# Patient Record
Sex: MALE | Race: WHITE | URBAN - METROPOLITAN AREA
[De-identification: names, ages, dates, MRNs, and addresses within clinical notes are randomized per-mention and may not be internally consistent; named-entity substitution may affect disease eponyms.]

---

## 2017-01-01 ENCOUNTER — OFFICE VISIT (OUTPATIENT)
Dept: PULMONOLOGY | Facility: CLINIC | Age: 67
End: 2017-01-01
Attending: INTERNAL MEDICINE
Payer: COMMERCIAL

## 2017-01-01 ENCOUNTER — TELEPHONE (OUTPATIENT)
Dept: PULMONOLOGY | Facility: CLINIC | Age: 67
End: 2017-01-01

## 2017-01-01 VITALS
OXYGEN SATURATION: 99 % | HEIGHT: 74 IN | WEIGHT: 194.8 LBS | DIASTOLIC BLOOD PRESSURE: 86 MMHG | SYSTOLIC BLOOD PRESSURE: 123 MMHG | BODY MASS INDEX: 25 KG/M2 | TEMPERATURE: 98.5 F | RESPIRATION RATE: 16 BRPM | HEART RATE: 84 BPM

## 2017-01-01 DIAGNOSIS — J84.112 IPF (IDIOPATHIC PULMONARY FIBROSIS) (H): ICD-10-CM

## 2017-01-01 DIAGNOSIS — J84.112 IPF (IDIOPATHIC PULMONARY FIBROSIS) (H): Primary | ICD-10-CM

## 2017-01-01 LAB
ALBUMIN SERPL-MCNC: 3.7 G/DL (ref 3.4–5)
ALP SERPL-CCNC: 99 U/L (ref 40–150)
ALT SERPL W P-5'-P-CCNC: 21 U/L (ref 0–70)
AST SERPL W P-5'-P-CCNC: 21 U/L (ref 0–45)
BILIRUB DIRECT SERPL-MCNC: 0.1 MG/DL (ref 0–0.2)
BILIRUB SERPL-MCNC: 0.3 MG/DL (ref 0.2–1.3)
DLCOUNC-%PRED-PRE: 20 %
DLCOUNC-PRE: 5.81 ML/MIN/MMHG
DLCOUNC-PRED: 28.45 ML/MIN/MMHG
ERV-%PRED-PRE: 75 %
ERV-PRE: 1.16 L
ERV-PRED: 1.54 L
EXPTIME-PRE: 5.77 SEC
FEF2575-%PRED-PRE: 150 %
FEF2575-PRE: 4.26 L/SEC
FEF2575-PRED: 2.84 L/SEC
FEFMAX-%PRED-PRE: 81 %
FEFMAX-PRE: 7.75 L/SEC
FEFMAX-PRED: 9.55 L/SEC
FEV1-%PRED-PRE: 59 %
FEV1-PRE: 2.24 L
FEV1FEV6-PRE: 92 %
FEV1FEV6-PRED: 78 %
FEV1FVC-PRE: 92 %
FEV1FVC-PRED: 76 %
FEV1SVC-PRE: 96 %
FEV1SVC-PRED: 68 %
FIFMAX-PRE: 3.99 L/SEC
FVC-%PRED-PRE: 48 %
FVC-PRE: 2.43 L
FVC-PRED: 4.98 L
IC-%PRED-PRE: 29 %
IC-PRE: 1.17 L
IC-PRED: 3.99 L
PROT SERPL-MCNC: 7.6 G/DL (ref 6.8–8.8)
VA-%PRED-PRE: 50 %
VA-PRE: 3.85 L
VC-%PRED-PRE: 42 %
VC-PRE: 2.34 L
VC-PRED: 5.53 L

## 2017-01-01 PROCEDURE — 80076 HEPATIC FUNCTION PANEL: CPT | Performed by: INTERNAL MEDICINE

## 2017-01-01 PROCEDURE — 99212 OFFICE O/P EST SF 10 MIN: CPT | Mod: ZF

## 2017-01-01 PROCEDURE — 36415 COLL VENOUS BLD VENIPUNCTURE: CPT | Performed by: INTERNAL MEDICINE

## 2017-01-01 ASSESSMENT — ENCOUNTER SYMPTOMS
HEMOPTYSIS: 0
LOSS OF CONSCIOUSNESS: 0
TROUBLE SWALLOWING: 1
DIARRHEA: 0
EYE PAIN: 0
WHEEZING: 0
TREMORS: 0
DEPRESSION: 0
DYSPNEA ON EXERTION: 1
INSOMNIA: 0
SINUS PAIN: 1
SEIZURES: 0
LIGHT-HEADEDNESS: 1
PANIC: 1
NAIL CHANGES: 0
EYE IRRITATION: 0
DOUBLE VISION: 0
CLAUDICATION: 0
SMELL DISTURBANCE: 0
DIZZINESS: 1
DIFFICULTY URINATING: 1
SHORTNESS OF BREATH: 1
DYSURIA: 1
ABDOMINAL PAIN: 1
TACHYCARDIA: 1
EYE REDNESS: 0
POSTURAL DYSPNEA: 0
SNORES LOUDLY: 0
NAUSEA: 0
VOMITING: 0
WEAKNESS: 1
SINUS CONGESTION: 1
ORTHOPNEA: 0
RECTAL PAIN: 0
SLEEP DISTURBANCES DUE TO BREATHING: 1
HOARSE VOICE: 1
TINGLING: 0
EXERCISE INTOLERANCE: 1
SPEECH CHANGE: 0
NECK MASS: 0
RECTAL BLEEDING: 0
BLOOD IN STOOL: 0
DISTURBANCES IN COORDINATION: 1
NERVOUS/ANXIOUS: 1
NUMBNESS: 0
HEADACHES: 0
JAUNDICE: 0
BLOATING: 0
DECREASED CONCENTRATION: 0
CONSTIPATION: 1
FLANK PAIN: 0
BOWEL INCONTINENCE: 0
HEMATURIA: 0
HYPOTENSION: 0
PARALYSIS: 0
COUGH DISTURBING SLEEP: 1
EYE WATERING: 0
SYNCOPE: 0
PALPITATIONS: 1
POOR WOUND HEALING: 1
LEG PAIN: 0
HEARTBURN: 0
LEG SWELLING: 0
RESPIRATORY PAIN: 1
HYPERTENSION: 0
SKIN CHANGES: 0
TASTE DISTURBANCE: 0
COUGH: 1
SPUTUM PRODUCTION: 1
SORE THROAT: 0
MEMORY LOSS: 0

## 2017-01-01 ASSESSMENT — PAIN SCALES - GENERAL: PAINLEVEL: MILD PAIN (3)

## 2017-01-27 NOTE — NURSING NOTE
Chief Complaint   Patient presents with     Interstitial Lung Disease (ILD)     Follow up on Demetri and his ILD     Wu Saeed CMA at 11:18 AM on 1/27/2017

## 2017-01-27 NOTE — LETTER
1/27/2017      RE: Demetri Guadarrama  [address]   [place] [address]       [facility]    Reason for Visit  Demetri Guadarrama is a 66 year old year old male who is being seen for Interstitial Lung Disease (ILD)    HPI    Mr. Guadarrama is a 66-year-old with idiopathic pulmonary fibrosis who is here for followup.  He reports that he is getting more winded with walking.  He uses oxygen up to 8 liters with an oxymizer when he walks.  He has noticed that he cannot walk as far as he used to without having to stop and rest.  He does have a chronic cough and he uses TRI-Care on his nose, which is an over-the-counter ointment.  He is not sure if it includes Vaseline.  He takes pirfenidone 3 tablets 3 times a day.  He is tolerating it well, although he does report that he will get an upset stomach on rare occasions.  He started pirfenidone in 04/2015.             Current Outpatient Prescriptions   Medication     pirfenidone (ESBRIET) 267 MG capsule     order for DME     pirfenidone (ESBRIET) 267 MG capsule     ORDER FOR DME     aspirin 81 MG tablet     calcipotriene (DOVONOX) 0.005 % cream     Calcitriol (VECTICAL) 3 MCG/GM OINT     clobetasol (TEMOVATE) 0.05 % ointment     escitalopram (LEXAPRO) 20 MG tablet     fluticasone (FLONASE) 50 MCG/ACT nasal spray     levothyroxine (SYNTHROID, LEVOTHROID) 88 MCG tablet     Multiple Vitamins-Minerals (CENTRUM SILVER) per tablet     coal tar-salicylic acid 1 % shampoo     OLANZapine (ZYPREXA) 15 MG tablet     pimecrolimus (ELIDEL) 1 % cream     risperiDONE microspheres (RISPERDAL CONSTA) 25 MG injection     simvastatin (ZOCOR) 20 MG tablet     tamsulosin (FLOMAX) 0.4 MG 24 hr capsule     traZODone (DESYREL) 100 MG tablet     No current facility-administered medications for this visit.     Allergies   Allergen Reactions     Haldol [Haloperidol]      Patient states that he gets aggressive     Penicillins      Patient states that he  "passed out     Past Medical History   Diagnosis Date     Schizophrenia (H)      Hypothyroidism      Psoriasis      Hyperlipidemia      IPF (idiopathic pulmonary fibrosis) (H)      chest CT 2003 reported to show pulm fibrosis, IPF dx confirmed 3-2015 by review of CT. Started pirfenidone 4-2015.       No past surgical history on file.    Social History     Social History     Marital Status: Single     Spouse Name: N/A     Number of Children: N/A     Years of Education: N/A     Occupational History     Not on file.     Social History Main Topics     Smoking status: Former Smoker -- 1.00 packs/day for 0 years     Types: Cigarettes     Start date: 01/26/1972     Quit date: 02/26/1972     Smokeless tobacco: Not on file     Alcohol Use: No      Comment: not since 1979     Drug Use: Not on file     Sexual Activity: Not on file     Other Topics Concern     Not on file     Social History Narrative    Retired.  Used to work in Public Housing as a .       Family History   Problem Relation Age of Onset     Chronic Obstructive Pulmonary Disease Mother              ROS Pulmonary  A complete ROS was otherwise negative except as noted in the HPI.    Vitals: /86 mmHg  Pulse 84  Temp(Src) 98.5  F (36.9  C) (Oral)  Resp 16  Ht 1.88 m (6' 2\")  Wt 88.361 kg (194 lb 12.8 oz)  BMI 25.00 kg/m2  SpO2 99%    Exam:   GENERAL APPEARANCE: Well developed, well nourished, alert, and in no apparent distress.  RESP: good air flow throughout.  Bibasilar inspiratory crackles. No rhonchi. No wheezes.  CV: Normal S1, S2, regular rhythm, normal rate. No murmur.  No LE edema.   MS: extremities normal. No clubbing. No cyanosis.  SKIN: red scaly patches.  NEURO: Mentation intact, speech normal, normal gait and stance.  PSYCH: mentation appears normal. and affect normal/bright.    Results:  Recent Results (from the past 168 hour(s))   Hepatic panel    Collection Time: 01/27/17 10:21 AM   Result Value Ref Range    Bilirubin Direct 0.1 0.0 - " 0.2 mg/dL    Bilirubin Total 0.3 0.2 - 1.3 mg/dL    Albumin 3.7 3.4 - 5.0 g/dL    Protein Total 7.6 6.8 - 8.8 g/dL    Alkaline Phosphatase 99 40 - 150 U/L    ALT 21 0 - 70 U/L    AST 21 0 - 45 U/L   General PFT Lab (Please always keep checked)    Collection Time: 01/27/17 10:32 AM   Result Value Ref Range    FVC-Pred 4.98 L    FVC-Pre 2.43 L    FVC-%Pred-Pre 48 %    FEV1-Pre 2.24 L    FEV1-%Pred-Pre 59 %    FEV1FVC-Pred 76 %    FEV1FVC-Pre 92 %    FEFMax-Pred 9.55 L/sec    FEFMax-Pre 7.75 L/sec    FEFMax-%Pred-Pre 81 %    FEF2575-Pred 2.84 L/sec    FEF2575-Pre 4.26 L/sec    SYH0701-%Pred-Pre 150 %    ExpTime-Pre 5.77 sec    FIFMax-Pre 3.99 L/sec    VC-Pred 5.53 L    VC-Pre 2.34 L    VC-%Pred-Pre 42 %    IC-Pred 3.99 L    IC-Pre 1.17 L    IC-%Pred-Pre 29 %    ERV-Pred 1.54 L    ERV-Pre 1.16 L    ERV-%Pred-Pre 75 %    FEV1FEV6-Pred 78 %    FEV1FEV6-Pre 92 %    DLCOunc-Pred 28.45 ml/min/mmHg    DLCOunc-Pre 5.81 ml/min/mmHg    DLCOunc-%Pred-Pre 20 %    VA-Pre 3.85 L    VA-%Pred-Pre 50 %    FEV1SVC-Pred 68 %    FEV1SVC-Pre 96 %     I reviewed LFTs and pulmonary function test that were performed today.  LFTs are normal.  PFT today shows moderately severe restrictive lung disease with a severely reduced diffusion.  Lung function is stable compared to 3 months ago.  Prior to starting pirfenidone, FVC had dropped about 300 mL.  After starting pirfenidone, lung function has dropped only 200 mL in the past almost 2 years.  Prior to that, it had dropped about 300 mL in 4 months.     I reviewed results with the patient.      Assessment and plan:   Mr. Guadarrama is a 66-year-old with idiopathic pulmonary fibrosis who is here for followup.   1.  Idiopathic pulmonary fibrosis.  He has significant disease; however, PFT today shows some stability, and overall progression has slowed since starting pirfenidone.  I discussed with Mr. Guadarrama and his family today that pirfenidone slows progression and sometimes stabilizes disease but is not  a cure, and they realize this.  His primary care physician recommended a hospice consult.  Mr. Guadarrama and his family met with hospice but think that it is not his best option at this time because he would need to stop pirfenidone.  I think this is a reasonable decision.  If he stops pirfenidone, then I think his pulmonary fibrosis decline would accelerate.  However, I think it would be helpful for him to meet with Palliative Care to discuss symptom management, and we will set up that appointment.  This would help with symptom management and support.    2.  Exertional hypoxia.  He has significant exertional hypoxia and will continue oxygen up to 8 liters with activity.  He will continue using the oxymizer.    3.  Medication monitoring.  LFTs are normal today.  We will check again in 3 months when he returns to clinic.         Dawna Spaulding MD

## 2017-01-27 NOTE — PROGRESS NOTES
[facility]    Reason for Visit  Demetri Guadarrama is a 66 year old year old male who is being seen for Interstitial Lung Disease (ILD)    HPI    Mr. Guadarrama is a 66-year-old with idiopathic pulmonary fibrosis who is here for followup.  He reports that he is getting more winded with walking.  He uses oxygen up to 8 liters with an oxymizer when he walks.  He has noticed that he cannot walk as far as he used to without having to stop and rest.  He does have a chronic cough and he uses TRI-Care on his nose, which is an over-the-counter ointment.  He is not sure if it includes Vaseline.  He takes pirfenidone 3 tablets 3 times a day.  He is tolerating it well, although he does report that he will get an upset stomach on rare occasions.  He started pirfenidone in 04/2015.             Current Outpatient Prescriptions   Medication     pirfenidone (ESBRIET) 267 MG capsule     order for DME     pirfenidone (ESBRIET) 267 MG capsule     ORDER FOR DME     aspirin 81 MG tablet     calcipotriene (DOVONOX) 0.005 % cream     Calcitriol (VECTICAL) 3 MCG/GM OINT     clobetasol (TEMOVATE) 0.05 % ointment     escitalopram (LEXAPRO) 20 MG tablet     fluticasone (FLONASE) 50 MCG/ACT nasal spray     levothyroxine (SYNTHROID, LEVOTHROID) 88 MCG tablet     Multiple Vitamins-Minerals (CENTRUM SILVER) per tablet     coal tar-salicylic acid 1 % shampoo     OLANZapine (ZYPREXA) 15 MG tablet     pimecrolimus (ELIDEL) 1 % cream     risperiDONE microspheres (RISPERDAL CONSTA) 25 MG injection     simvastatin (ZOCOR) 20 MG tablet     tamsulosin (FLOMAX) 0.4 MG 24 hr capsule     traZODone (DESYREL) 100 MG tablet     No current facility-administered medications for this visit.     Allergies   Allergen Reactions     Haldol [Haloperidol]      Patient states that he gets aggressive     Penicillins      Patient states that he passed out     Past Medical History   Diagnosis Date     Schizophrenia (H)       "Hypothyroidism      Psoriasis      Hyperlipidemia      IPF (idiopathic pulmonary fibrosis) (H)      chest CT 2003 reported to show pulm fibrosis, IPF dx confirmed 3-2015 by review of CT. Started pirfenidone 4-2015.       No past surgical history on file.    Social History     Social History     Marital Status: Single     Spouse Name: N/A     Number of Children: N/A     Years of Education: N/A     Occupational History     Not on file.     Social History Main Topics     Smoking status: Former Smoker -- 1.00 packs/day for 0 years     Types: Cigarettes     Start date: 01/26/1972     Quit date: 02/26/1972     Smokeless tobacco: Not on file     Alcohol Use: No      Comment: not since 1979     Drug Use: Not on file     Sexual Activity: Not on file     Other Topics Concern     Not on file     Social History Narrative    Retired.  Used to work in [employer] as a .       Family History   Problem Relation Age of Onset     Chronic Obstructive Pulmonary Disease Mother              ROS Pulmonary  A complete ROS was otherwise negative except as noted in the HPI.    Vitals: /86 mmHg  Pulse 84  Temp(Src) 98.5  F (36.9  C) (Oral)  Resp 16  Ht 1.88 m (6' 2\")  Wt 88.361 kg (194 lb 12.8 oz)  BMI 25.00 kg/m2  SpO2 99%    Exam:   GENERAL APPEARANCE: Well developed, well nourished, alert, and in no apparent distress.  RESP: good air flow throughout.  Bibasilar inspiratory crackles. No rhonchi. No wheezes.  CV: Normal S1, S2, regular rhythm, normal rate. No murmur.  No LE edema.   MS: extremities normal. No clubbing. No cyanosis.  SKIN: red scaly patches.  NEURO: Mentation intact, speech normal, normal gait and stance.  PSYCH: mentation appears normal. and affect normal/bright.    Results:  Recent Results (from the past 168 hour(s))   Hepatic panel    Collection Time: 01/27/17 10:21 AM   Result Value Ref Range    Bilirubin Direct 0.1 0.0 - 0.2 mg/dL    Bilirubin Total 0.3 0.2 - 1.3 mg/dL    Albumin 3.7 3.4 - 5.0 g/dL "    Protein Total 7.6 6.8 - 8.8 g/dL    Alkaline Phosphatase 99 40 - 150 U/L    ALT 21 0 - 70 U/L    AST 21 0 - 45 U/L   General PFT Lab (Please always keep checked)    Collection Time: 01/27/17 10:32 AM   Result Value Ref Range    FVC-Pred 4.98 L    FVC-Pre 2.43 L    FVC-%Pred-Pre 48 %    FEV1-Pre 2.24 L    FEV1-%Pred-Pre 59 %    FEV1FVC-Pred 76 %    FEV1FVC-Pre 92 %    FEFMax-Pred 9.55 L/sec    FEFMax-Pre 7.75 L/sec    FEFMax-%Pred-Pre 81 %    FEF2575-Pred 2.84 L/sec    FEF2575-Pre 4.26 L/sec    [id number]-%Pred-Pre 150 %    ExpTime-Pre 5.77 sec    FIFMax-Pre 3.99 L/sec    VC-Pred 5.53 L    VC-Pre 2.34 L    VC-%Pred-Pre 42 %    IC-Pred 3.99 L    IC-Pre 1.17 L    IC-%Pred-Pre 29 %    ERV-Pred 1.54 L    ERV-Pre 1.16 L    ERV-%Pred-Pre 75 %    FEV1FEV6-Pred 78 %    FEV1FEV6-Pre 92 %    DLCOunc-Pred 28.45 ml/min/mmHg    DLCOunc-Pre 5.81 ml/min/mmHg    DLCOunc-%Pred-Pre 20 %    VA-Pre 3.85 L    VA-%Pred-Pre 50 %    FEV1SVC-Pred 68 %    FEV1SVC-Pre 96 %     I reviewed LFTs and pulmonary function test that were performed today.  LFTs are normal.  PFT today shows moderately severe restrictive lung disease with a severely reduced diffusion.  Lung function is stable compared to 3 months ago.  Prior to starting pirfenidone, FVC had dropped about 300 mL.  After starting pirfenidone, lung function has dropped only 200 mL in the past almost 2 years.  Prior to that, it had dropped about 300 mL in 4 months.     I reviewed results with the patient.      Assessment and plan:   Mr. Guadarrama is a 66-year-old with idiopathic pulmonary fibrosis who is here for followup.   1.  Idiopathic pulmonary fibrosis.  He has significant disease; however, PFT today shows some stability, and overall progression has slowed since starting pirfenidone.  I discussed with Mr. Guadarrama and his family today that pirfenidone slows progression and sometimes stabilizes disease but is not a cure, and they realize this.  His primary care physician recommended a  hospice consult.  Mr. Guadarrama and his family met with hospice but think that it is not his best option at this time because he would need to stop pirfenidone.  I think this is a reasonable decision.  If he stops pirfenidone, then I think his pulmonary fibrosis decline would accelerate.  However, I think it would be helpful for him to meet with Palliative Care to discuss symptom management, and we will set up that appointment.  This would help with symptom management and support.    2.  Exertional hypoxia.  He has significant exertional hypoxia and will continue oxygen up to 8 liters with activity.  He will continue using the oxymizer.    3.  Medication monitoring.  LFTs are normal today.  We will check again in 3 months when he returns to clinic.

## 2017-01-27 NOTE — MR AVS SNAPSHOT
After Visit Summary   1/27/2017    Demetri Guadarrama    MRN: [id number]           Patient Information     Date Of Birth          1950        Visit Information        Provider Department      1/27/2017 11:30 AM Dawna Spaulding MD [facility]        Today's Diagnoses     IPF (idiopathic pulmonary fibrosis) (H)    -  1        Follow-ups after your visit        Follow-up notes from your care team     Return in about 3 months (around 4/27/2017).      Your next 10 appointments already scheduled     May 19, 2017  9:45 AM   Lab with  LAB   [facility] ([facility])    [address] [place] [id number]   [phone]            May 19, 2017 10:00 AM   FULL PULMONARY FUNCTION with  PFL C   [facility] Pulmonary Function Testing ([facility])    [address] [place] [id number] [phone]            May 19, 2017 11:00 AM   (Arrive by 10:45 AM)   Return Interstitial Lung with Dawna Spaulding MD   [facility] ([facility])    [address] [place] [id number] [phone]              Future tests that were ordered for you today     Open Future Orders        Priority Expected Expires Ordered    Pulmonary Function Test Routine 4/27/2017 1/27/2018 1/27/2017            Who to contact     If you have questions or need follow up information about today's clinic visit or your schedule please contact [facility] directly at [phone].  Normal or non-critical lab and imaging results will be communicated to you by MyChart, letter or phone within 4 business days after the clinic has received the results. If you do not hear from us within 7 days, please contact the clinic through MyChart or phone. If you have a critical or abnormal lab result, we will notify you by phone as  "soon as possible.  Submit refill requests through [employer] or call your pharmacy and they will forward the refill request to us. Please allow 3 business days for your refill to be completed.          Additional Information About Your Visit        [employer] Information     [employer] lets you send messages to your doctor, view your test results, renew your prescriptions, schedule appointments and more. To sign up, go to [email or web address] . Click on \"Log in\" on the left side of the screen, which will take you to the Welcome page. Then click on \"Sign up Now\" on the right side of the page.     You will be asked to enter the access code listed below, as well as some personal information. Please follow the directions to create your username and password.     Your access code is: [email or web address]  Expires: 2017 12:09 PM     Your access code will  in 90 days. If you need help or a new code, please call your [facility] clinic or [phone].        Care EveryWhere ID     This is your Care EveryWhere ID. This could be used by other organizations to access your [facility] medical records  [id number]        Your Vitals Were     Pulse Temperature Respirations Height BMI (Body Mass Index) Pulse Oximetry    84 98.5  F (36.9  C) (Oral) 16 1.88 m (6' 2\") 25.00 kg/m2 99%       Blood Pressure from Last 3 Encounters:   17 123/86   10/14/16 130/84   16 107/71    Weight from Last 3 Encounters:   17 88.361 kg (194 lb 12.8 oz)   10/14/16 88.542 kg (195 lb 3.2 oz)   16 87.907 kg (193 lb 12.8 oz)               Primary Care Provider Office Phone # Fax #    Chuckie Cruz [phone]       [facility] [address]  [place] [address]        Thank you!     Thank you for choosing [facility]  for your care. Our goal is always to provide you with excellent care. Hearing back from our patients is one way we can continue to improve our services. Please " take a few minutes to complete the written survey that you may receive in the mail after your visit with us. Thank you!             Your Updated Medication List - Protect others around you: Learn how to safely use, store and throw away your medicines at www.disposemymeds.org.          This list is accurate as of: 1/27/17 12:09 PM.  Always use your most recent med list.                   Brand Name Dispense Instructions for use    aspirin 81 MG tablet      Take by mouth daily       calcipotriene 0.005 % cream    DOVONOX     Apply topically 2 times daily       CENTRUM SILVER per tablet      Take 1 tablet by mouth daily       clobetasol 0.05 % ointment    TEMOVATE     Apply topically 2 times daily       coal tar-salicylic acid 1 % shampoo      Apply topically nightly as needed for irritation       FLOMAX 0.4 MG capsule   Generic drug:  tamsulosin      Take by mouth daily       fluticasone 50 MCG/ACT spray    FLONASE     Spray 2 sprays into both nostrils daily       levothyroxine 88 MCG tablet    SYNTHROID/LEVOTHROID     Take by mouth daily       LEXAPRO 20 MG tablet   Generic drug:  escitalopram      Take 20 mg by mouth daily       * order for DME     1 Device    Please provide patient with oxygen for portability. Patient currently requires 8 liters continuous flow via nasal canula with oximyzer to maintain saturations above 90%.  Current equipment does not meet his oxygen requirements.  Prefer liquid oxygen.       * order for DME     1 Device    Oxygen 8 LPM via nasal canula with oxymizer with activity.  Oxygen will be for a lifetime.       pimecrolimus 1 % cream    ELIDEL     Apply topically 2 times daily       * pirfenidone 267 MG capsule    ESBRIET    270 capsule    Take 3 capsules (801 mg) by mouth 3 times daily (with meals)       * pirfenidone 267 MG capsule    ESBRIET    270 capsule    Take 3 capsules three times daily.       risperiDONE microspheres 25 MG injection    risperDAL CONSTA     Inject 25 mg into the  muscle every 14 days       traZODone 100 MG tablet    DESYREL     Take by mouth At Bedtime       VECTICAL 3 MCG/GM Oint   Generic drug:  Calcitriol      Externally apply 0.5 inches topically       ZOCOR 20 MG tablet   Generic drug:  simvastatin      Take by mouth At Bedtime       ZYPREXA 15 MG tablet   Generic drug:  OLANZapine      Take 15 mg by mouth At Bedtime       * Notice:  This list has 4 medication(s) that are the same as other medications prescribed for you. Read the directions carefully, and ask your doctor or other care provider to review them with you.

## 2017-04-13 NOTE — TELEPHONE ENCOUNTER
Received call from hospice Nurse Demetra Gonzalez that pt was admitted April 3rd. If we need to reach hospice- [phone]. RN notified Dr. Spaulding.   Poppy John RN BSN